# Patient Record
(demographics unavailable — no encounter records)

---

## 2025-07-21 NOTE — HISTORY OF PRESENT ILLNESS
[FreeTextEntry1] : 75M PMH class III obesity, HTN, HLD, hx prostate cancer s/p resection, who presents to weight management for initial evaluation.   Weight/Diet History: He engaged in weight loss interventions including Weight Watchers, lost to low of 180 lbs 6 years ago, from high 200's, but has steadily gained every since.  Highest weight - now tied with highest weight.  Weight today: 300.6 lbs, BMI 45.7, BF 38.6%   Diet: B (6 am): Cereal (usually cheerios) with banana, milk.  L (12): Coldcuts (low salt) sandwich, fruit D (6-7 pm): Turkey burger w/vegetables and salad.  Dessert: usually no. Snack: Sometimes fruit Night-time eating: no, unless out. Beverages: milk, occasional glass of wine when out, or other alcohol when socializing. Coffee, splenda, 2% milk. Rare diet soda.  Binging: when out, parties, etc, will 'eat his cake/ice cream and his wife's) Fast-food/Restaurants: Restaurants not often recently.  Pizza, other stuff when  Cook/Prepare meals: Added oils: Food Journal: On Weight Watchers Usually just overindulges when at someone else's house pool, etc, somewhere outside house. Eats wife's leftovers.    Exercise: Walks when weather permits. [place] a [place] [place] and is planning to start with a .    Sleep: "good", goes to bed 10 pm, up 5:30 am, estimates 7 hours per night. Does snore. No Hx sleep study. Drinks 12 cups of half-caff.   Social Hx: No smoking. No alcohol. , has 2 kids, 4 grandchildren. Retired, for a .  No FHx thyroid cancer.   Labs (11/20/24): CBC (Hgb 17.3), CMP (gluc 110), TChol 154, LDL 92, HDL 42, Trig 112

## 2025-07-21 NOTE — ASSESSMENT
[FreeTextEntry1] : 75M PMH class III obesity, HTN, HLD, hx prostate cancer s/p resection, who presents to weight management for initial evaluation.   # Class 3 obesity c/b HTN, HLD: Weight today 300.6 lbs, BMI 45.7, BF 38.6%, currently at his heaviest weight, he reached the upper 200's 6 years ago and lost to 180 lbs over the course of ~7 months on Weight Watchers, has slowly/steadily regained over the years. He is concerned now because his BP is not well controlled despite being on several agents. Diet noted for eating much more / less-healthily when at social events, has people over or out with others etc, including pizza/cookies/ice cream, some alcohol, etc. I recommend sleep eval, as he has never had one, has class 3 obesity on 4 BP meds not adequately controlled, drinks "12 cups of coffee per day", etc. - We discussed tracking again, whether on Weight Watchers (he did very well in the past) or w/[place], etc, to help increase self-accountability and awareness when out.  - Don't eat wife's leftovers - Defers dietician referral - Will work on escalating physical activity - Sleep specialist referral. - Updated labs at next visit - May consider Zepbound if dx w/BIANKA. Has done well with diet in the past and we may start there, will re-evaluate. - F/u as indicated